# Patient Record
Sex: MALE | Race: WHITE | Employment: UNEMPLOYED | URBAN - METROPOLITAN AREA
[De-identification: names, ages, dates, MRNs, and addresses within clinical notes are randomized per-mention and may not be internally consistent; named-entity substitution may affect disease eponyms.]

---

## 2017-04-25 ENCOUNTER — HOSPITAL ENCOUNTER (EMERGENCY)
Age: 27
Discharge: HOME | End: 2017-04-25
Attending: EMERGENCY MEDICINE
Payer: COMMERCIAL

## 2017-04-25 VITALS
TEMPERATURE: 98.3 F | DIASTOLIC BLOOD PRESSURE: 89 MMHG | SYSTOLIC BLOOD PRESSURE: 130 MMHG | RESPIRATION RATE: 16 BRPM | BODY MASS INDEX: 2.73 KG/M2 | WEIGHT: 18 LBS | HEIGHT: 68 IN | HEART RATE: 95 BPM | OXYGEN SATURATION: 97 %

## 2017-04-25 DIAGNOSIS — M54.50 ACUTE BILATERAL LOW BACK PAIN WITHOUT SCIATICA: Primary | ICD-10-CM

## 2017-04-25 PROCEDURE — 99283 EMERGENCY DEPT VISIT LOW MDM: CPT

## 2017-04-25 PROCEDURE — 6370000000 HC RX 637 (ALT 250 FOR IP): Performed by: EMERGENCY MEDICINE

## 2017-04-25 RX ORDER — NAPROXEN 250 MG/1
500 TABLET ORAL ONCE
Status: COMPLETED | OUTPATIENT
Start: 2017-04-25 | End: 2017-04-25

## 2017-04-25 RX ORDER — NAPROXEN 500 MG/1
500 TABLET ORAL 2 TIMES DAILY WITH MEALS
Qty: 60 TABLET | Refills: 0 | Status: SHIPPED | OUTPATIENT
Start: 2017-04-25 | End: 2017-05-10

## 2017-04-25 RX ORDER — CYCLOBENZAPRINE HCL 10 MG
10 TABLET ORAL 3 TIMES DAILY PRN
Qty: 20 TABLET | Refills: 0 | Status: SHIPPED | OUTPATIENT
Start: 2017-04-25

## 2017-04-25 RX ADMIN — NAPROXEN 500 MG: 250 TABLET ORAL at 04:18

## 2017-04-25 ASSESSMENT — ENCOUNTER SYMPTOMS
ABDOMINAL PAIN: 0
EYE REDNESS: 0
COUGH: 0
BACK PAIN: 1

## 2017-04-25 ASSESSMENT — PAIN DESCRIPTION - ORIENTATION: ORIENTATION: LOWER

## 2017-04-25 ASSESSMENT — PAIN SCALES - GENERAL: PAINLEVEL_OUTOF10: 9

## 2017-04-25 ASSESSMENT — PAIN DESCRIPTION - PAIN TYPE: TYPE: ACUTE PAIN

## 2017-04-25 ASSESSMENT — PAIN DESCRIPTION - LOCATION: LOCATION: BACK

## 2023-07-26 ENCOUNTER — HOSPITAL ENCOUNTER (OUTPATIENT)
Dept: DATA CONVERSION | Facility: HOSPITAL | Age: 33
Discharge: HOME | End: 2023-07-26

## 2023-07-26 DIAGNOSIS — Z88.0 ALLERGY STATUS TO PENICILLIN: ICD-10-CM

## 2023-07-26 DIAGNOSIS — M79.671 PAIN IN RIGHT FOOT: ICD-10-CM

## 2023-07-26 DIAGNOSIS — M79.89 OTHER SPECIFIED SOFT TISSUE DISORDERS: ICD-10-CM

## 2023-07-26 DIAGNOSIS — M10.071 IDIOPATHIC GOUT, RIGHT ANKLE AND FOOT: Primary | ICD-10-CM

## 2023-07-26 DIAGNOSIS — L53.9 ERYTHEMATOUS CONDITION, UNSPECIFIED: ICD-10-CM

## 2023-07-26 LAB
ANION GAP SERPL CALCULATED.3IONS-SCNC: 11 MMOL/L (ref 0–19)
BASOPHILS # BLD AUTO: 0.04 K/UL (ref 0–0.22)
BASOPHILS NFR BLD AUTO: 0.5 % (ref 0–1)
BUN SERPL-MCNC: 20 MG/DL (ref 8–25)
BUN/CREAT SERPL: 28.6 RATIO (ref 8–21)
CALCIUM SERPL-MCNC: 9.5 MG/DL (ref 8.5–10.4)
CHLORIDE SERPL-SCNC: 105 MMOL/L (ref 97–107)
CO2 SERPL-SCNC: 21 MMOL/L (ref 24–31)
CREAT SERPL-MCNC: 0.7 MG/DL (ref 0.4–1.6)
DEPRECATED RDW RBC AUTO: 42.5 FL (ref 37–54)
DIFFERENTIAL METHOD BLD: ABNORMAL
EOSINOPHIL # BLD AUTO: 0.11 K/UL (ref 0–0.45)
EOSINOPHIL NFR BLD: 1.3 % (ref 0–3)
ERYTHROCYTE [DISTWIDTH] IN BLOOD BY AUTOMATED COUNT: 13 % (ref 11.7–15)
GFR SERPL CREATININE-BSD FRML MDRD: 123 ML/MIN/1.73 M2
GLUCOSE SERPL-MCNC: 123 MG/DL (ref 65–99)
HCT VFR BLD AUTO: 43.3 % (ref 41–50)
HGB BLD-MCNC: 15.1 GM/DL (ref 13.5–16.5)
IMM GRANULOCYTES # BLD AUTO: 0.04 K/UL (ref 0–0.1)
LYMPHOCYTES # BLD AUTO: 2.29 K/UL (ref 1.2–3.2)
LYMPHOCYTES NFR BLD MANUAL: 27.4 % (ref 20–40)
MCH RBC QN AUTO: 31.1 PG (ref 26–34)
MCHC RBC AUTO-ENTMCNC: 34.9 % (ref 31–37)
MCV RBC AUTO: 89.3 FL (ref 80–100)
MONOCYTES # BLD AUTO: 0.75 K/UL (ref 0–0.8)
MONOCYTES NFR BLD MANUAL: 9 % (ref 0–8)
NEUTROPHILS # BLD AUTO: 5.14 K/UL
NEUTROPHILS # BLD AUTO: 5.14 K/UL (ref 1.8–7.7)
NEUTROPHILS.IMMATURE NFR BLD: 0.5 % (ref 0–1)
NEUTS SEG NFR BLD: 61.3 % (ref 50–70)
NRBC BLD-RTO: 0 /100 WBC
PLATELET # BLD AUTO: 260 K/UL (ref 150–450)
PMV BLD AUTO: 9.4 CU (ref 7–12.6)
POTASSIUM SERPL-SCNC: 3.8 MMOL/L (ref 3.4–5.1)
RBC # BLD AUTO: 4.85 M/UL (ref 4.5–5.5)
SODIUM SERPL-SCNC: 137 MMOL/L (ref 133–145)
URATE SERPL-MCNC: 8.4 MG/DL (ref 3.6–7.7)
WBC # BLD AUTO: 8.4 K/UL (ref 4.5–11)

## 2024-01-07 ENCOUNTER — HOSPITAL ENCOUNTER (EMERGENCY)
Facility: HOSPITAL | Age: 34
Discharge: HOME | End: 2024-01-07

## 2024-01-07 ENCOUNTER — APPOINTMENT (OUTPATIENT)
Dept: RADIOLOGY | Facility: HOSPITAL | Age: 34
End: 2024-01-07

## 2024-01-07 VITALS
BODY MASS INDEX: 31.14 KG/M2 | RESPIRATION RATE: 18 BRPM | OXYGEN SATURATION: 99 % | SYSTOLIC BLOOD PRESSURE: 121 MMHG | HEIGHT: 68 IN | HEART RATE: 71 BPM | TEMPERATURE: 97.5 F | DIASTOLIC BLOOD PRESSURE: 78 MMHG | WEIGHT: 205.47 LBS

## 2024-01-07 DIAGNOSIS — R05.1 ACUTE COUGH: Primary | ICD-10-CM

## 2024-01-07 PROCEDURE — 71046 X-RAY EXAM CHEST 2 VIEWS: CPT

## 2024-01-07 PROCEDURE — 94640 AIRWAY INHALATION TREATMENT: CPT

## 2024-01-07 PROCEDURE — 99283 EMERGENCY DEPT VISIT LOW MDM: CPT

## 2024-01-07 PROCEDURE — 2500000002 HC RX 250 W HCPCS SELF ADMINISTERED DRUGS (ALT 637 FOR MEDICARE OP, ALT 636 FOR OP/ED): Performed by: NURSE PRACTITIONER

## 2024-01-07 PROCEDURE — 2500000004 HC RX 250 GENERAL PHARMACY W/ HCPCS (ALT 636 FOR OP/ED): Performed by: NURSE PRACTITIONER

## 2024-01-07 RX ORDER — PREDNISONE 20 MG/1
40 TABLET ORAL ONCE
Status: COMPLETED | OUTPATIENT
Start: 2024-01-07 | End: 2024-01-07

## 2024-01-07 RX ORDER — IPRATROPIUM BROMIDE AND ALBUTEROL SULFATE 2.5; .5 MG/3ML; MG/3ML
3 SOLUTION RESPIRATORY (INHALATION) ONCE
Status: COMPLETED | OUTPATIENT
Start: 2024-01-07 | End: 2024-01-07

## 2024-01-07 RX ORDER — ALBUTEROL SULFATE 90 UG/1
2 AEROSOL, METERED RESPIRATORY (INHALATION) EVERY 6 HOURS PRN
Qty: 8 G | Refills: 0 | Status: SHIPPED | OUTPATIENT
Start: 2024-01-07

## 2024-01-07 RX ORDER — PREDNISONE 20 MG/1
40 TABLET ORAL DAILY
Qty: 8 TABLET | Refills: 0 | Status: SHIPPED | OUTPATIENT
Start: 2024-01-07 | End: 2024-01-12

## 2024-01-07 RX ADMIN — PREDNISONE 40 MG: 20 TABLET ORAL at 10:30

## 2024-01-07 RX ADMIN — IPRATROPIUM BROMIDE AND ALBUTEROL SULFATE 3 ML: 2.5; .5 SOLUTION RESPIRATORY (INHALATION) at 10:30

## 2024-01-07 ASSESSMENT — COLUMBIA-SUICIDE SEVERITY RATING SCALE - C-SSRS
1. IN THE PAST MONTH, HAVE YOU WISHED YOU WERE DEAD OR WISHED YOU COULD GO TO SLEEP AND NOT WAKE UP?: NO
2. HAVE YOU ACTUALLY HAD ANY THOUGHTS OF KILLING YOURSELF?: NO
6. HAVE YOU EVER DONE ANYTHING, STARTED TO DO ANYTHING, OR PREPARED TO DO ANYTHING TO END YOUR LIFE?: NO

## 2024-01-07 ASSESSMENT — PAIN - FUNCTIONAL ASSESSMENT: PAIN_FUNCTIONAL_ASSESSMENT: 0-10

## 2024-01-07 ASSESSMENT — PAIN SCALES - GENERAL
PAINLEVEL_OUTOF10: 0 - NO PAIN
PAINLEVEL_OUTOF10: 0 - NO PAIN

## 2024-01-07 NOTE — ED PROVIDER NOTES
HPI   Chief Complaint   Patient presents with    Cough     Covid x3 weeks ago, cough since. Denies sob and chest pain. Has not taken anything for cough at home        HPI  See my MDM                  No data recorded                Patient History   No past medical history on file.  No past surgical history on file.  No family history on file.  Social History     Tobacco Use    Smoking status: Not on file    Smokeless tobacco: Not on file   Substance Use Topics    Alcohol use: Not on file    Drug use: Not on file       Physical Exam   ED Triage Vitals [01/07/24 0957]   Temp Heart Rate Resp BP   36.4 °C (97.5 °F) 75 18 125/82      SpO2 Temp Source Heart Rate Source Patient Position   99 % Oral Monitor --      BP Location FiO2 (%)     -- --       Physical Exam  CONSTITUTIONAL: Vital signs reviewed as charted, well-developed and in no distress  Eyes: Extraocular muscles are intact. Pupils equal round and reactive to light. Conjunctiva are pink.    ENT: Mucous membranes are moist. Tongue in the midline. Pharynx was without erythema or exudates, uvula midline  LUNGS: Breath sounds equal and clear to auscultation. Good air exchange, no wheezes rales or retractions, pulse oximetry is charted.  HEART: Regular rate and rhythm without murmur thrill or rub, strong tones, auscultation is normal.  ABDOMEN: Soft and nontender without guarding rebound rigidity or mass. Bowel sounds are present and normal in all quadrants. There is no palpable masses or aneurysms identified. No hepatosplenomegaly, normal abdominal exam.  Neuro: The patient is awake, alert and oriented ×3. Moving all 4 extremities and answering questions appropriately.   MUSCULOSKELETAL: The calves are nontender to palpation. Full gross active range of motion.   PSYCH: Awake alert oriented, normal mood and affect.  Skin:  Dry, normal color, warm to the touch, no rash present.      ED Course & MDM   Diagnoses as of 01/07/24 1217   Acute cough       Medical Decision  Making  History obtained from: patient    Vital signs, nursing notes, current medications, past medical history, Surgical history, allergies, social history, family History were reviewed.         HPI:  Patient is a 33-year-old male history of bronchitis presenting to ED today for evaluation of a cough.  States tested positive for COVID-19 3 weeks ago and has had a persistent cough but otherwise feels fine.  No fevers chills or night sweats.  States his coworkers were concerned he might have pneumonia.  No nausea vomiting diarrhea.  Denies dizziness, chest pain, shortness of breath, abdominal pain or lower extremity edema.  He is nontoxic and well-appearing      10 point ROS was reviewed and negative except Noted above in HPI.  DDX: as listed above    Chest x-ray interpreted by the radiologist shows:  Impression:    Normal chest.              Medications administered during this visit (name and route): DuoNeb isolation, oral prednisone      MDM Summary/considerations:  I estimate there is LOW risk for EPIGLOTTITIS, PNEUMONIA, MENINGITIS, OR URINARY TRACT INFECTION, thus I consider the discharge disposition reasonable. Also, there is no evidence for peritonitis, sepsis, or toxicity. We have discussed the diagnosis and risks, and we agree with discharging home to follow-up with their primary doctor. We also discussed returning to the Emergency Department immediately if new or worsening symptoms occur. We have discussed the symptoms which are most concerning (e.g., changing or worsening pain, trouble swallowing or breathing, neck stiffness, fever) that necessitate immediate return.    Chest x-ray is grossly unremarkable, patient will be discharged home in stable condition albuterol inhaler, prednisone burst.  Will follow PCP 1 to 2 days for reevaluation.  Was discharged home in stable condition.        All of the patient's questions were answered to the best of my ability.  Patient states understanding that they have  been screened for an emergency today and we have not found any etiology of symptoms that requires emergent treatment or admission to the hospital at this point. They understand that they have not had definitive care day and require follow-up for treatment of their condition. They also state understanding that they may have an emergent condition that may potentially have not of detected at this visit and they must return to the emergency department if they develop any worsening of symptoms or new complaints.      Critical Care: Not warranted at this time    Prescriptions provided include:  Albuterol, prednisone    This chart was completed using voice recognition transcription software. Please excuse any errors of transcription including grammatical, punctuation, syntax and spelling errors.  Please contact me with any questions regarding this chart.  Patient 33-year-old male history of bronchitis presenting to ED today complaining of cough.  States he was diagnosed COVID-19 about 3 weeks ago is feeling fine except he has a persistent cough.  No fever chills or night sweats.  No nausea vomiting diarrhea.  States sometimes it is productive.    Procedure  Procedures     GENEVA Rogers-MIKEY  01/07/24 1210

## 2024-01-07 NOTE — Clinical Note
Roshan Sandy was seen and treated in our emergency department on 1/7/2024.  He may return to work on 01/07/2024.       If you have any questions or concerns, please don't hesitate to call.      Luis Felipe Contreras, GENEVA-CNP

## 2024-02-25 ENCOUNTER — HOSPITAL ENCOUNTER (EMERGENCY)
Facility: HOSPITAL | Age: 34
Discharge: HOME | End: 2024-02-25
Attending: EMERGENCY MEDICINE

## 2024-02-25 VITALS
DIASTOLIC BLOOD PRESSURE: 88 MMHG | BODY MASS INDEX: 30.87 KG/M2 | TEMPERATURE: 98.2 F | HEART RATE: 86 BPM | HEIGHT: 68 IN | SYSTOLIC BLOOD PRESSURE: 134 MMHG | RESPIRATION RATE: 15 BRPM | WEIGHT: 203.71 LBS | OXYGEN SATURATION: 97 %

## 2024-02-25 DIAGNOSIS — M25.561 ACUTE PAIN OF RIGHT KNEE: Primary | ICD-10-CM

## 2024-02-25 PROCEDURE — 99282 EMERGENCY DEPT VISIT SF MDM: CPT

## 2024-02-25 ASSESSMENT — PAIN - FUNCTIONAL ASSESSMENT: PAIN_FUNCTIONAL_ASSESSMENT: 0-10

## 2024-02-25 ASSESSMENT — PAIN DESCRIPTION - LOCATION: LOCATION: OTHER (COMMENT)

## 2024-02-25 ASSESSMENT — PAIN DESCRIPTION - DESCRIPTORS: DESCRIPTORS: ACHING

## 2024-02-25 ASSESSMENT — COLUMBIA-SUICIDE SEVERITY RATING SCALE - C-SSRS
6. HAVE YOU EVER DONE ANYTHING, STARTED TO DO ANYTHING, OR PREPARED TO DO ANYTHING TO END YOUR LIFE?: NO
2. HAVE YOU ACTUALLY HAD ANY THOUGHTS OF KILLING YOURSELF?: NO
1. IN THE PAST MONTH, HAVE YOU WISHED YOU WERE DEAD OR WISHED YOU COULD GO TO SLEEP AND NOT WAKE UP?: NO

## 2024-02-25 ASSESSMENT — PAIN DESCRIPTION - PAIN TYPE: TYPE: ACUTE PAIN

## 2024-02-25 ASSESSMENT — PAIN DESCRIPTION - ORIENTATION: ORIENTATION: RIGHT

## 2024-02-25 ASSESSMENT — PAIN SCALES - GENERAL: PAINLEVEL_OUTOF10: 8

## 2024-02-25 NOTE — ED PROVIDER NOTES
HPI   Chief Complaint   Patient presents with    Knee Pain     Pt comes in for complaint of right knee pain and swelling that started this morning when he woke up. Pt rates pain at 8/10. Denies any injury or recent falls.        HPI  34-year-old male presents with complaint of right knee pain and swelling.  The patient noticed he woke up this morning and his right knee was swollen and had some slight pain in the back of his knee he had this about a month ago as well never followed up with anybody.  He cannot think of any injuries that may have occurred that caused this.  He states he does work about 80 hours a week and is on his feet.  He has not taken medicine for it.  His pain and swelling has improved throughout the day.  Movement seems to make it worse.  No other complaints.                  No data recorded                   Patient History   History reviewed. No pertinent past medical history.  History reviewed. No pertinent surgical history.  No family history on file.  Social History     Tobacco Use    Smoking status: Unknown    Smokeless tobacco: Not on file   Substance Use Topics    Alcohol use: Not on file    Drug use: Not on file       Physical Exam   ED Triage Vitals [02/25/24 0107]   Temperature Heart Rate Respirations BP   36.8 °C (98.2 °F) 86 15 134/88      Pulse Ox Temp Source Heart Rate Source Patient Position   97 % Oral Monitor --      BP Location FiO2 (%)     -- --       Physical Exam  General:  Awake, alert, no acute distress.  Head: Normocephalic, Atraumatic  Neck: Supple, trachea midline, no stridor  Skin: Warm and dry, no rashes   Lungs:  No acute respiratory distress, speaking in full sentences without difficulty  Neuro:  No gross focal neurologic deficits, NIH is 0  Musculoskeletal:  Full range of motion in all 4 extremities.  Right knee: Negative laxity or pain with varus valgus strain, patella tracks medially, no swelling  Psychiatric:  Alert oriented x 3, Good insight into  condition.  ED Course & MDM   Diagnoses as of 02/25/24 0139   Acute pain of right knee       Medical Decision Making  I do not suspect the patient has any osseous abnormalities he does not require an x-ray.  He may have a sprain or he may have a ligamentous or cartilaginous injury.  I discussed this with him at bedside.  Advised him to buy a knee brace and take Tylenol Motrin rest and ice his knee he was given referral to orthopedic surgery he is stable for discharge.    Procedure  Procedures     Donnie Winchester,   02/25/24 0124       Donnie Winchester,   02/25/24 0139

## 2024-04-23 ENCOUNTER — HOSPITAL ENCOUNTER (EMERGENCY)
Facility: HOSPITAL | Age: 34
Discharge: HOME | End: 2024-04-23

## 2024-04-23 VITALS
HEIGHT: 68 IN | BODY MASS INDEX: 30.91 KG/M2 | OXYGEN SATURATION: 98 % | SYSTOLIC BLOOD PRESSURE: 129 MMHG | DIASTOLIC BLOOD PRESSURE: 91 MMHG | HEART RATE: 71 BPM | RESPIRATION RATE: 18 BRPM | TEMPERATURE: 97.5 F | WEIGHT: 203.93 LBS

## 2024-04-23 DIAGNOSIS — M10.9 GOUTY ARTHRITIS OF RIGHT GREAT TOE: Primary | ICD-10-CM

## 2024-04-23 PROCEDURE — 99283 EMERGENCY DEPT VISIT LOW MDM: CPT

## 2024-04-23 PROCEDURE — 2500000001 HC RX 250 WO HCPCS SELF ADMINISTERED DRUGS (ALT 637 FOR MEDICARE OP): Performed by: CLINICAL NURSE SPECIALIST

## 2024-04-23 RX ORDER — COLCHICINE 0.6 MG/1
0.6 TABLET ORAL ONCE
Qty: 1 TABLET | Refills: 0 | Status: SHIPPED | OUTPATIENT
Start: 2024-04-23 | End: 2024-04-23

## 2024-04-23 RX ORDER — COLCHICINE 0.6 MG/1
1.2 TABLET ORAL ONCE
Status: COMPLETED | OUTPATIENT
Start: 2024-04-23 | End: 2024-04-23

## 2024-04-23 RX ADMIN — COLCHICINE 1.2 MG: 0.6 TABLET, FILM COATED ORAL at 14:20

## 2024-04-23 ASSESSMENT — PAIN DESCRIPTION - PAIN TYPE: TYPE: ACUTE PAIN

## 2024-04-23 ASSESSMENT — PAIN DESCRIPTION - ORIENTATION: ORIENTATION: RIGHT

## 2024-04-23 ASSESSMENT — PAIN SCALES - GENERAL: PAINLEVEL_OUTOF10: 10 - WORST POSSIBLE PAIN

## 2024-04-23 ASSESSMENT — PAIN - FUNCTIONAL ASSESSMENT: PAIN_FUNCTIONAL_ASSESSMENT: 0-10

## 2024-04-23 ASSESSMENT — PAIN DESCRIPTION - FREQUENCY: FREQUENCY: CONSTANT/CONTINUOUS

## 2024-04-23 ASSESSMENT — PAIN DESCRIPTION - ONSET: ONSET: SUDDEN

## 2024-04-23 ASSESSMENT — PAIN DESCRIPTION - DESCRIPTORS: DESCRIPTORS: SHARP

## 2024-04-23 ASSESSMENT — PAIN DESCRIPTION - PROGRESSION: CLINICAL_PROGRESSION: NOT CHANGED

## 2024-04-23 NOTE — DISCHARGE INSTRUCTIONS
Monitor for signs and symptoms of infection follow-up with primary care physician in 2 days for reevaluation  Return to the emergency department any worsening symptoms or concerns  First dose of colchicine was given in the emergency department.  Repeat second dose 1 hour after this initial dose.  Today it was noted that your blood pressure was elevated follow-up with primary care physician for reevaluation of your blood pressure monitor your blood pressure daily and journal the results for the primary care physician.  Referral to primary care has been provided to you  Follow-up with podiatry within 1 week for reevaluation of gout

## 2024-04-23 NOTE — Clinical Note
Roshan Sandy was seen and treated in our emergency department on 4/23/2024.  He may return to work on 04/25/2024.       If you have any questions or concerns, please don't hesitate to call.      Mahi Gonzalez, GENEVA-CNP

## 2024-04-23 NOTE — ED PROVIDER NOTES
Department of Emergency Medicine   ED  Provider Note  Admit Date/RoomTime: 4/23/2024  2:07 PM  ED Room: ST26/ST26        History of Present Illness:  Chief Complaint   Patient presents with    Gout     On Thursday I started having swelling and sharp pain in my rt foot I was taking aleve but it did not help         Roshan Sandy is a 34 y.o. male history of gout in the right foot presenting to the ED for gout exacerbation, beginning 5 days.  Patient states on Thursday he started having pain in his right foot along the great toe with redness hurts to bear weight.  Started taking Aleve but continues to have pain.  Reports swelling and sharp pain with ambulation.  Denies any fall or injury.  Denies any numbness or tingling.  Patient reports he always has gout in his right great toe he is on no preventative medication for gout.  Denies any fever chills no nausea no vomiting.  Denies any calf pain knee pain.  States otherwise in his normal state of health.  He is able to ambulate    Review of Systems:   Pertinent positives and negatives are stated within HPI, all other systems reviewed and are negative.        --------------------------------------------- PAST HISTORY ---------------------------------------------  Past Medical History:  has no past medical history on file.  Past Surgical History:  has no past surgical history on file.  Social History:    Family History: family history is not on file.. Unless otherwise noted, family history is non contributory  The patient’s home medications have been reviewed.  Allergies: Amoxicillin and Penicillins        ---------------------------------------------------PHYSICAL EXAM--------------------------------------    GENERAL APPEARANCE: Awake and alert.   VITAL SIGNS: As per the nurses' triage record.  Blood pressure elevated  HEENT: Normocephalic, atraumatic. Extraocular muscles are intact. Conjunctiva are pink. Negative scleral icterus. Mucous membranes are moist. Tongue in the  "midline. Pharynx was without erythema or exudates, uvula midline  CHEST: Nontender to palpation. Clear to auscultation bilaterally. No rales, rhonchi, or wheezing.   HEART: S1, S2. Regular rate and rhythm. No murmurs, gallops or rubs.  Strong and equal pulses in the extremities.   ABDOMEN: Soft, nontender, nondistended, positive bowel sounds, no palpable masses.  MUSCULCSKELETAL:   Right lower extremity quadriceps intact.  No pain palpation or manipulation of the knee.  No swelling noted.  Patient able to bend extend the knee no difficulty.  No calf redness warmth or tenderness noted.  Negative Hadley test.  No pain palpation manipulation of the ankle no redness or warmth no edema noted.  Pedal pulse strong and intact cap refill less than 2 sensation intact.  Right great toe dorsal aspect is erythematous warm to touch tender to touch.  Does not appear to be cellulitic and more consistent with a gout presentation.  No abscesses no rashes no lesions or sores.  Patient reports this is always how his gout appears.  Wiggles toes no difficulty.  No sign of a septic joint at this time.  Moving all extremities per normal neurovascular intact.  Ambulatory  NEUROLOGICAL: Awake, alert and oriented x 3. Power intact in the upper and lower extremities. Sensation is intact to light touch in the upper and lower extremities.   IMMUNOLOGICAL: No lymphatic streaking noted   DERM: No petechiae, rashes, or ecchymoses.          ------------------------- NURSING NOTES AND VITALS REVIEWED ---------------------------  The nursing notes within the ED encounter and vital signs as below have been reviewed by myself  BP (!) 129/91 (BP Location: Left arm, Patient Position: Sitting)   Pulse 71   Temp 36.4 °C (97.5 °F)   Resp 18   Ht 1.727 m (5' 8\")   Wt 92.5 kg (203 lb 14.8 oz)   SpO2 98%   BMI 31.01 kg/m²     Oxygen Saturation Interpretation: 99% room air          The patient’s available past medical records and past encounters were " reviewed.          -----------------------DIAGNOSTIC RESULTS------------------------  LABS:    Labs Reviewed - No data to display    As interpreted by me, the above displayed labs are abnormal. All other labs obtained during this visit were within normal range or not returned as of this dictation.          No orders to display           No orders to display           ------------------------------ ED COURSE/MEDICAL DECISION MAKING----------------------  Medical Decision Making:   Exam: A medically appropriate exam performed, outlined above, given the known history and presentation.    History obtained from: Review of medical record nursing notes patient      Social Determinants of Health considered during this visit: Takes care of himself at home.      PAST MEDICAL HISTORY/Chronic Conditions Affecting Care     has no past medical history on file.       CC/HPI Summary, Social Determinants of health, Records Reviewed, DDx, testing done/not done, ED Course, Reassessment, disposition considerations/shared decision making with patient, consults, disposition:   Presents with right great toe pain history of gout  Patient reports that he has a gout exacerbation.  Similar to his previous gout in the past reports he has been on steroids and then the last time received colchicine which helped his pain.  He tried Aleve at home with little to no improvement.  He is ambulatory.  No sign of a septic joint.  Does not appear to be infectious or cellulitis at this time.  No bruising no bony abnormality noted.  He has full range of motion.  He is ambulatory based on his clinical presentation history and symptoms consistent with gout exacerbation.  Patient advised on supportive care measures at home for gout.  Offered supportive shoe he has declined.  Will give him referral to podiatry for follow-up.  Also referral to primary care physician.  Patient is noted of elevated blood pressure no history of hypertension advised him to journal  his blood pressure daily and follow-up with primary care.  Patient verbalizes understanding first dose of colchicine given in the emergency department second dose to be given at home 1 hour later.  Patient verbalizes understanding is amenable to discharge at this time.  Patient seen independently attending physician available for consultation if needed  CMT for discharge utilized for discharge planning    PROCEDURES  Unless otherwise noted below, none      CONSULTS:   None      Diagnoses as of 04/23/24 2963   Gouty arthritis of right great toe         This patient has remained hemodynamically stable during their ED course.      Critical Care: None      Counseling:  The emergency provider has spoken with the patient and discussed today’s results, in addition to providing specific details for the plan of care and counseling regarding the diagnosis and prognosis.  Questions are answered at this time and they are agreeable with the plan.         --------------------------------- IMPRESSION AND DISPOSITION ---------------------------------    IMPRESSION  1. Gouty arthritis of right great toe        DISPOSITION  Disposition: Discharge home  Patient condition is stable        NOTE: This report was transcribed using voice recognition software. Every effort was made to ensure accuracy; however, inadvertent computerized transcription errors may be present      GENEVA Sood-MIKEY  04/23/24 2803

## 2024-09-16 ENCOUNTER — APPOINTMENT (OUTPATIENT)
Dept: PRIMARY CARE | Facility: CLINIC | Age: 34
End: 2024-09-16

## 2024-09-16 VITALS
TEMPERATURE: 98.6 F | SYSTOLIC BLOOD PRESSURE: 120 MMHG | DIASTOLIC BLOOD PRESSURE: 78 MMHG | WEIGHT: 201 LBS | HEART RATE: 86 BPM | HEIGHT: 68 IN | OXYGEN SATURATION: 98 % | BODY MASS INDEX: 30.46 KG/M2

## 2024-09-16 DIAGNOSIS — Z80.42 FAMILY HISTORY OF MALIGNANT NEOPLASM OF PROSTATE: ICD-10-CM

## 2024-09-16 DIAGNOSIS — Z13.220 LIPID SCREENING: Primary | ICD-10-CM

## 2024-09-16 DIAGNOSIS — N52.9 ERECTILE DISORDER: ICD-10-CM

## 2024-09-16 PROCEDURE — 99385 PREV VISIT NEW AGE 18-39: CPT | Performed by: STUDENT IN AN ORGANIZED HEALTH CARE EDUCATION/TRAINING PROGRAM

## 2024-09-16 PROCEDURE — 1036F TOBACCO NON-USER: CPT | Performed by: STUDENT IN AN ORGANIZED HEALTH CARE EDUCATION/TRAINING PROGRAM

## 2024-09-16 PROCEDURE — 3008F BODY MASS INDEX DOCD: CPT | Performed by: STUDENT IN AN ORGANIZED HEALTH CARE EDUCATION/TRAINING PROGRAM

## 2024-09-16 ASSESSMENT — PATIENT HEALTH QUESTIONNAIRE - PHQ9
10. IF YOU CHECKED OFF ANY PROBLEMS, HOW DIFFICULT HAVE THESE PROBLEMS MADE IT FOR YOU TO DO YOUR WORK, TAKE CARE OF THINGS AT HOME, OR GET ALONG WITH OTHER PEOPLE: SOMEWHAT DIFFICULT
1. LITTLE INTEREST OR PLEASURE IN DOING THINGS: NEARLY EVERY DAY
5. POOR APPETITE OR OVEREATING: MORE THAN HALF THE DAYS
9. THOUGHTS THAT YOU WOULD BE BETTER OFF DEAD, OR OF HURTING YOURSELF: NOT AT ALL
7. TROUBLE CONCENTRATING ON THINGS, SUCH AS READING THE NEWSPAPER OR WATCHING TELEVISION: NOT AT ALL
3. TROUBLE FALLING OR STAYING ASLEEP OR SLEEPING TOO MUCH: NEARLY EVERY DAY
6. FEELING BAD ABOUT YOURSELF - OR THAT YOU ARE A FAILURE OR HAVE LET YOURSELF OR YOUR FAMILY DOWN: NOT AT ALL
8. MOVING OR SPEAKING SO SLOWLY THAT OTHER PEOPLE COULD HAVE NOTICED. OR THE OPPOSITE, BEING SO FIGETY OR RESTLESS THAT YOU HAVE BEEN MOVING AROUND A LOT MORE THAN USUAL: NOT AT ALL
2. FEELING DOWN, DEPRESSED OR HOPELESS: NOT AT ALL
SUM OF ALL RESPONSES TO PHQ9 QUESTIONS 1 AND 2: 3
4. FEELING TIRED OR HAVING LITTLE ENERGY: NEARLY EVERY DAY
SUM OF ALL RESPONSES TO PHQ QUESTIONS 1-9: 11

## 2024-09-16 ASSESSMENT — PAIN SCALES - GENERAL: PAINLEVEL: 0-NO PAIN

## 2024-09-16 NOTE — PROGRESS NOTES
Roshan Sandy is a 34 y.o. male who is presenting for annual physical exam.     Last  Visit: many years ago  Reported Health: Fair  Patient is accompanied by his girlfriend.  They report that the patient has had some weight gain over the past several months, despite the fact that he eats very little and works approximately 80 hours/week on his feet all the time.  He has also been having some issues with erectile dysfunction.  He will have morning erections.  He is typically able to get an erection when alone, however he is unable to achieve erection with his current girlfriend.    Dental, Vision, Hearing:  Regular dental visits: no  - Brushes teeth 2 times per day  Vision problems: no  - Wears glasses or contacts? no  Hearing loss: no    Immunization status:  Up to date: no -needs Tdap. will check in with health department since he is currently uninsured    Lifestyle:  Healthy diet: yes  Regular exercise: yes  Alcohol: no  Tobacco: no  Drugs: no    Reproductive Health:  Sexually active: yes  Contraception: yes  Erectile dysfunction: yes    Metabolic Screening:  Lipid profile: ordered  Glucose screen: ordered    Review of Systems  Gen: denies fever, chills, weight loss, fatigue  HEENT: denies sinus pressure, sinus congestion, runny nose, red eyes, itchy eyes, vision loss, ear pain, hearing loss, throat pain, trouble swallowing  Neck: denies neck pain, neck swelling or masses  Chest/breast: denies breast pain, breast lumps, nipple discharge  CV: denies chest pain, palpitations, fast heart rate, syncope  Resp: denies shortness of breath, cough, wheezing  GI: denies abdominal pain, nausea, diarrhea, constipation, hematochezia, melena  : denies dysuria, hematuria, vaginal/penile discharge, frequency. Notes erectile dysfunction.   Endo: denies polydipsia, polyuria, heat/cold intolerance, weight change, hair thinning  Heme: denies easy bruising, easy bleeding  Neuro: denies headache, numbness, tingling, memory loss,  "changes in vision  MSK: denies joint pain, joint swelling, weakness  Psych: denies suicidal ideation, homicidal ideation, depression, anxiety, mood swings  Skin: denies rashes, abnormal lesions, itching, changes in moles     Previous History  Past Medical History:   Diagnosis Date    Gout      History reviewed. No pertinent surgical history.  Social History     Tobacco Use    Smoking status: Never    Smokeless tobacco: Never   Substance Use Topics    Alcohol use: Not Currently    Drug use: Never     No family history on file.  Allergies   Allergen Reactions    Amoxicillin Hives    Penicillins Hives     Current Outpatient Medications   Medication Instructions    albuterol 90 mcg/actuation inhaler 2 puffs, inhalation, Every 6 hours PRN     Visit Vitals  /78 (BP Location: Left arm)   Pulse 86   Temp 37 °C (98.6 °F) (Temporal)   Ht 1.727 m (5' 8\")   Wt 91.2 kg (201 lb)   SpO2 98%   BMI 30.56 kg/m²   Smoking Status Never   BSA 2.09 m²      Physical Exam:  General: Alert and oriented, in no acute distress. Appears stated age, well-nourished, and well hydrated  HEENT:  - Head: Normocephalic and atraumatic   - Eyes: EOMI, PERRLA  - ENT: Hearing grossly intact. Mucus membranes pink and moist without lesions. Tonsils present without swelling or exudates. Poor dentition. TMs gray  Neck: Supple. No stiffness. No thyromegaly or thyroid nodules  Heart: RRR. No murmurs, clicks, or rubs  Lungs: Unlabored breathing. CTAB with no crackles, wheezes, or rhonchi  Abdomen: Normal BS in all 4 quadrants. Soft, non-tender, non-distended, with no masses  Extremities: Warm and well perfused. No edema. Normal peripheral pulses  Musculoskeletal: ROM intact. Strength 5/5 in BUE and BLE. No joint swelling. Normal gait and station  Neurological: Alert and oriented. No gross neurological deficits. Normal sensation. No weakness. DTRs +2/4   Psychological: Appropriate mood and affect  Skin: No rash, abnormal lesions, cyanosis, or erythema  "     Assessment & Plan  Lipid screening    Orders:    CBC; Future    Comprehensive metabolic panel; Future    Lipid panel; Future    Erectile disorder    Orders:    Tsh With Reflex To Free T4 If Abnormal; Future    CBC; Future    Comprehensive metabolic panel; Future    Hemoglobin A1c; Future    PSA; Future  Discussed that most common explanation for erectile dysfunction in a reasonably healthy young man is typically psychogenic.  However, patient does have fairly extensive history of medical issues in first-degree relatives including prostate cancer, type 2 diabetes, thyroid disease, any of which might explain his symptoms.  Family history of malignant neoplasm of prostate    Orders:    PSA; Future  Patient reports that his father has had prostate cancer, and his brother was recently diagnosed with prostate cancer at approximately age 38.     I will follow-up with patient once lab results are available.  Reviewed and approved by ANDRE VAN on 9/16/24 at 1:02 PM.

## 2025-03-11 ENCOUNTER — APPOINTMENT (OUTPATIENT)
Dept: RADIOLOGY | Facility: HOSPITAL | Age: 35
End: 2025-03-11

## 2025-03-11 ENCOUNTER — HOSPITAL ENCOUNTER (EMERGENCY)
Facility: HOSPITAL | Age: 35
Discharge: HOME | End: 2025-03-11

## 2025-03-11 VITALS
SYSTOLIC BLOOD PRESSURE: 144 MMHG | HEART RATE: 69 BPM | HEIGHT: 68 IN | WEIGHT: 206.13 LBS | TEMPERATURE: 97.3 F | RESPIRATION RATE: 18 BRPM | BODY MASS INDEX: 31.24 KG/M2 | DIASTOLIC BLOOD PRESSURE: 99 MMHG | OXYGEN SATURATION: 98 %

## 2025-03-11 DIAGNOSIS — M77.51 TENDINITIS OF RIGHT FOOT: Primary | ICD-10-CM

## 2025-03-11 PROCEDURE — 73630 X-RAY EXAM OF FOOT: CPT | Mod: RT

## 2025-03-11 PROCEDURE — 96372 THER/PROPH/DIAG INJ SC/IM: CPT

## 2025-03-11 PROCEDURE — 73630 X-RAY EXAM OF FOOT: CPT | Mod: RIGHT SIDE | Performed by: RADIOLOGY

## 2025-03-11 PROCEDURE — 99284 EMERGENCY DEPT VISIT MOD MDM: CPT

## 2025-03-11 PROCEDURE — 2500000004 HC RX 250 GENERAL PHARMACY W/ HCPCS (ALT 636 FOR OP/ED)

## 2025-03-11 RX ORDER — KETOROLAC TROMETHAMINE 30 MG/ML
15 INJECTION, SOLUTION INTRAMUSCULAR; INTRAVENOUS ONCE
Status: COMPLETED | OUTPATIENT
Start: 2025-03-11 | End: 2025-03-11

## 2025-03-11 RX ADMIN — KETOROLAC TROMETHAMINE 15 MG: 30 INJECTION, SOLUTION INTRAMUSCULAR at 14:58

## 2025-03-11 ASSESSMENT — COLUMBIA-SUICIDE SEVERITY RATING SCALE - C-SSRS
2. HAVE YOU ACTUALLY HAD ANY THOUGHTS OF KILLING YOURSELF?: NO
6. HAVE YOU EVER DONE ANYTHING, STARTED TO DO ANYTHING, OR PREPARED TO DO ANYTHING TO END YOUR LIFE?: NO
1. IN THE PAST MONTH, HAVE YOU WISHED YOU WERE DEAD OR WISHED YOU COULD GO TO SLEEP AND NOT WAKE UP?: NO

## 2025-03-11 ASSESSMENT — PAIN DESCRIPTION - FREQUENCY: FREQUENCY: INTERMITTENT

## 2025-03-11 ASSESSMENT — PAIN DESCRIPTION - ONSET: ONSET: SUDDEN

## 2025-03-11 ASSESSMENT — PAIN SCALES - GENERAL
PAINLEVEL_OUTOF10: 4
PAINLEVEL_OUTOF10: 6

## 2025-03-11 ASSESSMENT — PAIN DESCRIPTION - PROGRESSION: CLINICAL_PROGRESSION: NOT CHANGED

## 2025-03-11 ASSESSMENT — PAIN DESCRIPTION - LOCATION: LOCATION: FOOT

## 2025-03-11 ASSESSMENT — PAIN - FUNCTIONAL ASSESSMENT
PAIN_FUNCTIONAL_ASSESSMENT: 0-10
PAIN_FUNCTIONAL_ASSESSMENT: 0-10

## 2025-03-11 ASSESSMENT — PAIN DESCRIPTION - ORIENTATION: ORIENTATION: RIGHT

## 2025-03-11 ASSESSMENT — PAIN DESCRIPTION - PAIN TYPE: TYPE: ACUTE PAIN

## 2025-03-11 ASSESSMENT — PAIN DESCRIPTION - DESCRIPTORS: DESCRIPTORS: SHARP

## 2025-03-11 NOTE — ED PROVIDER NOTES
HPI   Chief Complaint   Patient presents with    Foot Pain     Since last Thursday I have had sharp pain in my rt foot on the latteral and medial part of my rt foot it hurts more to pivot and wt bare on my foot        Patient is a 35-year-old male presenting with right foot pain.  He states for several weeks, he has been dealing with pain in the anterior aspect of his foot.  Describes it as a dull, 4 out of 10 and that is only exacerbated by certain lateral movements.  He states that he had a small abrasion and redness in the posterior distal Achilles area.  He went to urgent care and got antibiotics.  The redness has cleared up.  He went to see his podiatrist who did not do any imaging and gave him a prescription for meloxicam.  He presents today looking for imaging of his right foot.  Denies any injuries that he knows of.  States he did not have any falls.  Denies numbness or tingling to his foot.  Patient denies fevers, chills, cough, sore throat, runny nose, chest pain, shortness of breath, abdominal pain, nausea, vomiting, diarrhea or urinary complaints.              Patient History   Past Medical History:   Diagnosis Date    Gout      History reviewed. No pertinent surgical history.  No family history on file.  Social History     Tobacco Use    Smoking status: Never    Smokeless tobacco: Never   Substance Use Topics    Alcohol use: Not Currently    Drug use: Never       Physical Exam   ED Triage Vitals [03/11/25 1437]   Temperature Heart Rate Respirations BP   36.3 °C (97.3 °F) 69 18 (!) 144/99      Pulse Ox Temp Source Heart Rate Source Patient Position   98 % Temporal Monitor Sitting      BP Location FiO2 (%)     Left arm --       Physical Exam  Vitals and nursing note reviewed.   Constitutional:       Appearance: He is well-developed.      Comments: Awake, sitting in examination bed   HENT:      Head: Normocephalic and atraumatic.      Nose: Nose normal.      Mouth/Throat:      Mouth: Mucous membranes are  moist.      Pharynx: Oropharynx is clear.   Eyes:      Extraocular Movements: Extraocular movements intact.      Conjunctiva/sclera: Conjunctivae normal.      Pupils: Pupils are equal, round, and reactive to light.   Cardiovascular:      Rate and Rhythm: Normal rate and regular rhythm.      Pulses: Normal pulses.      Heart sounds: Normal heart sounds. No murmur heard.  Pulmonary:      Effort: Pulmonary effort is normal. No respiratory distress.      Breath sounds: Normal breath sounds.   Abdominal:      General: Abdomen is flat.      Palpations: Abdomen is soft.      Tenderness: There is no abdominal tenderness.   Musculoskeletal:         General: No swelling. Normal range of motion.      Cervical back: Normal range of motion and neck supple.      Comments: Mild tenderness palpation of the anterior aspect of right foot near medial malleolus   Skin:     General: Skin is warm and dry.      Capillary Refill: Capillary refill takes less than 2 seconds.      Comments: DP and PT pulse strong and intact bilaterally   Neurological:      General: No focal deficit present.      Mental Status: He is alert and oriented to person, place, and time.   Psychiatric:         Mood and Affect: Mood normal.         Behavior: Behavior normal.           ED Course & MDM   Diagnoses as of 03/11/25 2205   Tendinitis of right foot                 No data recorded     [place] Coma Scale Score: 15 (03/11/25 1442 : Angeles Woods RN)                           Medical Decision Making  Patient is a 35-year-old male presenting with right foot pain.  Imaging ordered.  Conditions considered include but are not limited to: Contusion, fracture, ligament injury, tendinitis.    X-ray without acute findings.  Patient was given a dose of Toradol in the emergency department.  I believe this patient is at low risk for complication, and a disposition of discharge is acceptable.  Return to the Emergency Department if new or worsening symptoms including  headache, fever, chills, chest pain, shortness of breath, syncope, near syncope, abdominal pain, nausea, vomiting,  diarrhea, or worsening pain.  Patient's right foot was Ace wrapped by me prior to discharge.  Patient was neurovascularly intact prior to and after Ace wrap application.  Discussed with patient to utilize rest, ice, compression, elevation to help with symptoms.  Patient is agreeable to a disposition of discharge and to follow with respective fields in the next several days.    Portions of this note made with Dragon software, please be mindful of potential grammatical errors.      Medications   ketorolac (Toradol) injection 15 mg (15 mg intramuscular Given 3/11/25 9256)     XR foot right 3+ views   Final Result   No acute significant abnormality.             MACRO:   None        Signed by: Den Banuelos 3/11/2025 3:23 PM   Dictation workstation:   [id number]            Procedure  Procedures     Tomer Mercado PA-C  03/11/25 8464

## 2025-03-11 NOTE — DISCHARGE INSTRUCTIONS
I would recommend rest, ice, compression and elevation.  Continue with the prescription meloxicam.  Do not take ibuprofen and meloxicam together.    Be sure to take all medications, over the counter medications or prescription medications only as directed.    Be sure to follow up as directed in 1-2 days. All of the details of your follow up instructions are detailed in the follow up section of this packet.    If you are being discharged with any pains medications or muscle relaxers (norco, Vicodin, hydrocodone products, Percocet, oxycodone products, flexeril, cyclobenzaprine, robaxin, norflex, brand or generic, or any other pain controlling medications with the exception of Ibuprofen and regular Tylenol, do not drive or operate machinery, climb ladders or participate in any activity that could potentially put yourself or others at risk should you get dizzy, or be/feel impaired at all.    Return to emergency room without delay for ANY new or worsening pains or for any other symptoms or concerns. Return with worsening pains, nausea, vomiting, trouble breathing, palpitations, shortness of breath, inability to pass stool or urine, loss of control of stool or urine, any numbness or tingling (that is not normal for you), uncontrolled fevers, the passing of blood or other material in stool or urine, rashes, pains or for any other symptoms or concerns you may have. You are always welcome to return to the ER at any time for any reason or for any other concerns you may have.

## 2025-03-11 NOTE — Clinical Note
Roshan Sandy was seen and treated in our emergency department on 3/11/2025.  He may return to work on 03/14/2025.       If you have any questions or concerns, please don't hesitate to call.      Tomer Mercado PA-C

## 2025-04-20 ENCOUNTER — HOSPITAL ENCOUNTER (EMERGENCY)
Facility: HOSPITAL | Age: 35
Discharge: HOME | End: 2025-04-20
Attending: EMERGENCY MEDICINE

## 2025-04-20 ENCOUNTER — APPOINTMENT (OUTPATIENT)
Dept: RADIOLOGY | Facility: HOSPITAL | Age: 35
End: 2025-04-20

## 2025-04-20 VITALS
OXYGEN SATURATION: 99 % | RESPIRATION RATE: 16 BRPM | HEIGHT: 70 IN | SYSTOLIC BLOOD PRESSURE: 146 MMHG | WEIGHT: 206 LBS | DIASTOLIC BLOOD PRESSURE: 100 MMHG | TEMPERATURE: 98.6 F | BODY MASS INDEX: 29.49 KG/M2 | HEART RATE: 84 BPM

## 2025-04-20 DIAGNOSIS — M79.671 FOOT PAIN, RIGHT: Primary | ICD-10-CM

## 2025-04-20 DIAGNOSIS — R22.40 NODULE OF SKIN OF FOOT: ICD-10-CM

## 2025-04-20 DIAGNOSIS — I10 DIASTOLIC HYPERTENSION: ICD-10-CM

## 2025-04-20 PROCEDURE — 99283 EMERGENCY DEPT VISIT LOW MDM: CPT | Performed by: EMERGENCY MEDICINE

## 2025-04-20 PROCEDURE — 73630 X-RAY EXAM OF FOOT: CPT | Mod: RIGHT SIDE | Performed by: RADIOLOGY

## 2025-04-20 PROCEDURE — 73630 X-RAY EXAM OF FOOT: CPT | Mod: RT

## 2025-04-20 RX ORDER — IBUPROFEN 600 MG/1
600 TABLET ORAL EVERY 6 HOURS PRN
Qty: 20 TABLET | Refills: 0 | Status: SHIPPED | OUTPATIENT
Start: 2025-04-20 | End: 2025-04-25

## 2025-04-20 RX ORDER — IBUPROFEN 400 MG/1
400 TABLET ORAL ONCE
Status: DISCONTINUED | OUTPATIENT
Start: 2025-04-20 | End: 2025-04-20 | Stop reason: HOSPADM

## 2025-04-20 RX ORDER — ACETAMINOPHEN 325 MG/1
975 TABLET ORAL ONCE
Status: DISCONTINUED | OUTPATIENT
Start: 2025-04-20 | End: 2025-04-20 | Stop reason: HOSPADM

## 2025-04-20 ASSESSMENT — PAIN DESCRIPTION - FREQUENCY: FREQUENCY: CONSTANT/CONTINUOUS

## 2025-04-20 ASSESSMENT — PAIN - FUNCTIONAL ASSESSMENT: PAIN_FUNCTIONAL_ASSESSMENT: 0-10

## 2025-04-20 ASSESSMENT — PAIN DESCRIPTION - PROGRESSION: CLINICAL_PROGRESSION: NOT CHANGED

## 2025-04-20 ASSESSMENT — PAIN SCALES - GENERAL: PAINLEVEL_OUTOF10: 3

## 2025-04-20 ASSESSMENT — PAIN DESCRIPTION - PAIN TYPE: TYPE: ACUTE PAIN

## 2025-04-20 NOTE — ED PROVIDER NOTES
HPI   Chief Complaint   Patient presents with    Foot Injury     Injured right foot at work a few months ago. Having pain and swelling still       HPI        Patient History   Medical History[1]  Surgical History[2]  Family History[3]  Social History[4]    Physical Exam   ED Triage Vitals   Temperature Heart Rate Respirations BP   04/20/25 0944 04/20/25 0944 04/20/25 0944 04/20/25 0947   37 °C (98.6 °F) 84 16 (!) 146/100      Pulse Ox Temp Source Heart Rate Source Patient Position   04/20/25 0944 04/20/25 0944 04/20/25 0944 04/20/25 0944   99 % Oral Monitor Sitting      BP Location FiO2 (%)     04/20/25 0944 --     Right arm        Physical Exam      ED Course & MDM   Diagnoses as of 04/20/25 1215   Foot pain, right   Nodule of skin of foot   Diastolic hypertension                 No data recorded     [place] Coma Scale Score: 15 (04/20/25 0958 : Angeles Woods RN)                           Medical Decision Making    The patient is a 35-year-old male presenting to the emergency department for evaluation of chronic right foot pain.  He states he noticed a bump on the back of the right foot about 1 to 2 months ago.  He states it is painful sometimes when he is walking standing and wearing his shoes.  He states he was sent home from work today because of it.  He states he does have an appointment with the podiatrist for next month but he could not get an appointment sooner.  He denies any recent injury or trauma.  He denies any weakness or numbness.  He denies any headache or visual changes.  No chest pain or shortness of breath.  No abdominal pain.  No nausea vomiting.  No diarrhea or constipation.  No urinary complaints.  He has not taken anything for symptom relief.  No other symptoms at this time.  All pertinent positives and negatives are recorded above.  All other systems reviewed and otherwise negative.  Vital signs with diastolic hypertension but otherwise within normal limits.  Physical exam with a  well-nourished well-developed male in no acute distress.  HEENT exam within normal limits.  He has no evidence of airway compromise or respiratory distress.  Abdominal exam is benign.  He does not have any gross motor, neurologic or vascular episodes on exam.  He does have a small nodule on the posterior aspect of the right heel.  No warmth or erythema.  No fluctuance.  No visible or palpable bony deformity.  Pulses are equal bilaterally.      Oral ibuprofen and oral acetaminophen ordered        XR foot right 3+ views   Final Result   No significant interval change from 03/11/2025. No evidence for acute   osseous abnormality.        Signed by: Gomez Romero 4/20/2025 12:08 PM   Dictation workstation:   [id number]           The patient does not have any gross motor, neurologic or vascular episodes on exam.  No visible or palpable bony deformity.  No joint laxity.  He does not have any evidence of fracture or dislocation on x-ray of the right foot.        The patient was released in good condition with a prescription for ibuprofen.  He was instructed to follow-up with his primary care physician within 1 to 2 days for further management of his current symptoms and repeat check of his blood pressure.  He was also given a referral to podiatry.  He will return to the emergency department sooner with worsening of symptoms or onset of new symptoms.      Impression/diagnosis  Right foot pain  Right foot nodule  Diastolic hypertension      I reviewed the results of the diagnostic imaging.  Formal radiology reading was completed by the radiologist    Procedure  Procedures       [1]   Past Medical History:  Diagnosis Date    Gout    [2] No past surgical history on file.  [3] No family history on file.  [4]   Social History  Tobacco Use    Smoking status: Never    Smokeless tobacco: Never   Substance Use Topics    Alcohol use: Not Currently    Drug use: Never        Jailene Vazquez MD  04/20/25 8713

## 2025-04-20 NOTE — DISCHARGE INSTRUCTIONS
Follow-up with your primary care physician within 1 to 2 days for further management of your current symptoms and repeat check of your blood pressure.    Follow-up with podiatry for further management for foot pain      Return to the emergency department sooner if worsening symptoms or onset of new symptoms

## 2025-05-26 ENCOUNTER — HOSPITAL ENCOUNTER (EMERGENCY)
Facility: HOSPITAL | Age: 35
Discharge: HOME | End: 2025-05-26
Attending: STUDENT IN AN ORGANIZED HEALTH CARE EDUCATION/TRAINING PROGRAM
Payer: COMMERCIAL

## 2025-05-26 VITALS
RESPIRATION RATE: 18 BRPM | BODY MASS INDEX: 31.57 KG/M2 | WEIGHT: 208.3 LBS | OXYGEN SATURATION: 98 % | TEMPERATURE: 98 F | SYSTOLIC BLOOD PRESSURE: 135 MMHG | HEIGHT: 68 IN | HEART RATE: 76 BPM | DIASTOLIC BLOOD PRESSURE: 87 MMHG

## 2025-05-26 DIAGNOSIS — S46.011A ROTATOR CUFF STRAIN, RIGHT, INITIAL ENCOUNTER: Primary | ICD-10-CM

## 2025-05-26 PROCEDURE — 99283 EMERGENCY DEPT VISIT LOW MDM: CPT | Performed by: STUDENT IN AN ORGANIZED HEALTH CARE EDUCATION/TRAINING PROGRAM

## 2025-05-26 PROCEDURE — 2500000004 HC RX 250 GENERAL PHARMACY W/ HCPCS (ALT 636 FOR OP/ED): Performed by: STUDENT IN AN ORGANIZED HEALTH CARE EDUCATION/TRAINING PROGRAM

## 2025-05-26 RX ORDER — PREDNISONE 50 MG/1
50 TABLET ORAL ONCE
Status: COMPLETED | OUTPATIENT
Start: 2025-05-26 | End: 2025-05-26

## 2025-05-26 RX ORDER — PREDNISONE 10 MG/1
TABLET ORAL
Qty: 60 TABLET | Refills: 0 | Status: SHIPPED | OUTPATIENT
Start: 2025-05-26 | End: 2025-06-10

## 2025-05-26 RX ADMIN — PREDNISONE 50 MG: 50 TABLET ORAL at 06:57

## 2025-05-26 ASSESSMENT — PAIN SCALES - GENERAL: PAINLEVEL_OUTOF10: 7

## 2025-05-26 ASSESSMENT — PAIN DESCRIPTION - LOCATION: LOCATION: SHOULDER

## 2025-05-26 ASSESSMENT — PAIN DESCRIPTION - ORIENTATION: ORIENTATION: RIGHT

## 2025-05-26 ASSESSMENT — PAIN - FUNCTIONAL ASSESSMENT: PAIN_FUNCTIONAL_ASSESSMENT: 0-10

## 2025-05-26 ASSESSMENT — PAIN DESCRIPTION - PAIN TYPE: TYPE: CHRONIC PAIN

## 2025-05-26 ASSESSMENT — PAIN DESCRIPTION - PROGRESSION: CLINICAL_PROGRESSION: NOT CHANGED

## 2025-05-26 ASSESSMENT — PAIN DESCRIPTION - ONSET: ONSET: ONGOING

## 2025-05-26 ASSESSMENT — COLUMBIA-SUICIDE SEVERITY RATING SCALE - C-SSRS
6. HAVE YOU EVER DONE ANYTHING, STARTED TO DO ANYTHING, OR PREPARED TO DO ANYTHING TO END YOUR LIFE?: NO
1. IN THE PAST MONTH, HAVE YOU WISHED YOU WERE DEAD OR WISHED YOU COULD GO TO SLEEP AND NOT WAKE UP?: NO
2. HAVE YOU ACTUALLY HAD ANY THOUGHTS OF KILLING YOURSELF?: NO

## 2025-05-26 ASSESSMENT — PAIN DESCRIPTION - DESCRIPTORS: DESCRIPTORS: ACHING

## 2025-05-26 ASSESSMENT — PAIN DESCRIPTION - FREQUENCY: FREQUENCY: CONSTANT/CONTINUOUS

## 2025-05-26 NOTE — DISCHARGE INSTRUCTIONS
You can take prednisone as prescribed for treatment of inflammation at your right rotator cuff.  This will help to reduce inflammation and pain to the area.  This will not get rid of the underlying rotator cuff injury which is likely a muscle strain or tendinitis of the rotator cuff.  You can use the rotator cuff strengthening exercises using information provided to strengthen the muscles of the rotator cuff to reduce the likelihood of reinjury and allow this to heal.  Try to rest is much as possible to avoid lifting anything heavy with the affected shoulder, you can take a couple of weeks to completely resolve on their own.  Return to the ED for any new or worsening symptoms including fevers, redness of the joint, significant swelling of the joint, inability to move in a specific direction which could be signs of developing infection or tendon rupture, these require reassessment by physician promptly.  You can use ice, and Tylenol in addition to the steroids are provided.  He should not take anti-inflammatories like ibuprofen and Aleve at the same time as the prednisone since they can cause irritation of the stomach when used together.

## 2025-05-26 NOTE — ED PROVIDER NOTES
HPI   Chief Complaint   Patient presents with    Shoulder Pain     Pt reports right shoulder pain that has been chronic issue for months, states last Thursday pt lost strength in right arm. Pt has full range of motion currently, MSPs intact        This is a 35-year-old male presenting to the ED for evaluation of right shoulder pain.  He has a history of gout, mostly affecting the right lower extremity.  He states that he did not have health insurance up until recently, he has had issues with his right shoulder for the past month with pain at the posterior shoulder especially with lifting.  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  Has been taking Aleve at home with minimal to moderate relief.  There is no radiation of the pain down the arm or to the neck, no chest pain, no shortness of breath.  He denies fevers or chills or redness of the joint affected.  He did work overnight last night.          History provided by:  Patient   used: No            Patient History   Medical History[1]  Surgical History[2]  Family History[3]  Social History[4]    Physical Exam   ED Triage Vitals [05/26/25 0633]   Temperature Heart Rate Respirations BP   36.7 °C (98 °F) 76 18 135/87      Pulse Ox Temp Source Heart Rate Source Patient Position   98 % Tympanic Monitor Sitting      BP Location FiO2 (%)     Left arm --       Physical Exam  General: well developed, well nourished 35-year-old male who is awake and alert, in no apparent distress  Eyes: sclera clear bilaterally  HENT: normocephalic, atraumatic.   CV: regular rate and rhythm, no murmur, no gallops, or rubs. radial pulses +2/4 bilaterally  Resp: clear to ascultation bilaterally, no wheezes, rales, or rhonchi  MSK: No midline spinal tenderness, strength +5/5 to upper extremities bilaterally including abduction, internal and external rotation at the shoulders, flexion extension of the  elbows and wrist,  strength bilaterally.  He has focal tenderness to the posterior aspect of the right shoulder which is worse with external rotation against resistance.  No erythema of the joint, no swelling.  Neuro: Sensation fully intact.  Psych: appropriate mood and affect, cooperative with exam  Skin: warm, dry, without evidence of rash or abrasions    ED Course & MDM   Diagnoses as of 05/26/25 0711   Rotator cuff strain, right, initial encounter                 No data recorded     [place] Coma Scale Score: 15 (05/26/25 0631 : Verna Ovalle RN)                           Medical Decision Making  The patient is awake and alert, in no apparent distress in the emergency department.  He complains of right shoulder pain, mostly at the posterior shoulder, that has been present for about a month and is worse with movement of the shoulder.  He does have normal range of motion, he is neurovascular intact on exam.  He has normal strength in all range of motion of the right upper extremity including  strength.  His pain is exacerbated by external rotation against resistance most suggestive of a rotator cuff injury (strain or tendinitis).  Given that he has full normal range of motion at the shoulder, I do not suspect complete rotator cuff tear.  There is no erythema or warmth of the joint, no fevers or infectious signs or symptoms, no evidence clinically of septic arthritis.  No radicular pain, no neck pain, no cervical spine tenderness to suggest cervical radiculopathy.  I considered x-ray imaging, the patient had no trauma preceding this, no focal bony tenderness, I do not feel there is any significant benefit to be had with x-ray imaging as I do not suspect fracture or dislocation or bony abnormality.  I discussed this with the patient, he is agreeable with starting on a steroid taper, he was provided rotator cuff strengthening exercises, advised on rest and decreasing strain on his shoulder while at work,  he was provided a work note for limited weightbearing and referred to orthopedics for further outpatient follow-up.  He was advised that if he does not improve with medication, rest, he may need additional physical therapy for his shoulder.  He was discharged in stable condition.    Procedure  Procedures       [1]   Past Medical History:  Diagnosis Date    Gout    [2] History reviewed. No pertinent surgical history.  [3] No family history on file.  [4]   Social History  Tobacco Use    Smoking status: Never    Smokeless tobacco: Never   Substance Use Topics    Alcohol use: Not Currently    Drug use: Never        Ehsan Woo DO  05/26/25 0711

## 2025-05-26 NOTE — Clinical Note
Roshan Sandy was seen and treated in our emergency department on 5/26/2025.  He may return to work on 05/27/2025.  He should avoid lifting heavy objects more than 40 to 50 pounds on his own, try to reduce strain on the right shoulder due to his injury until this heals.     If you have any questions or concerns, please don't hesitate to call.      Ehsan Woo, DO